# Patient Record
Sex: FEMALE | Race: BLACK OR AFRICAN AMERICAN | NOT HISPANIC OR LATINO | Employment: UNEMPLOYED | URBAN - METROPOLITAN AREA
[De-identification: names, ages, dates, MRNs, and addresses within clinical notes are randomized per-mention and may not be internally consistent; named-entity substitution may affect disease eponyms.]

---

## 2017-11-30 ENCOUNTER — LAB VISIT (OUTPATIENT)
Dept: LAB | Facility: HOSPITAL | Age: 27
End: 2017-11-30
Attending: FAMILY MEDICINE
Payer: COMMERCIAL

## 2017-11-30 ENCOUNTER — OFFICE VISIT (OUTPATIENT)
Dept: FAMILY MEDICINE | Facility: CLINIC | Age: 27
End: 2017-11-30
Payer: COMMERCIAL

## 2017-11-30 VITALS
WEIGHT: 233.69 LBS | SYSTOLIC BLOOD PRESSURE: 101 MMHG | BODY MASS INDEX: 39.9 KG/M2 | DIASTOLIC BLOOD PRESSURE: 79 MMHG | RESPIRATION RATE: 16 BRPM | HEIGHT: 64 IN | OXYGEN SATURATION: 99 % | TEMPERATURE: 98 F | HEART RATE: 79 BPM

## 2017-11-30 DIAGNOSIS — Z00.00 ROUTINE GENERAL MEDICAL EXAMINATION AT A HEALTH CARE FACILITY: ICD-10-CM

## 2017-11-30 DIAGNOSIS — Z00.00 ROUTINE GENERAL MEDICAL EXAMINATION AT A HEALTH CARE FACILITY: Primary | ICD-10-CM

## 2017-11-30 LAB
ALBUMIN SERPL BCP-MCNC: 3.4 G/DL
ALP SERPL-CCNC: 88 U/L
ALT SERPL W/O P-5'-P-CCNC: 9 U/L
ANION GAP SERPL CALC-SCNC: 8 MMOL/L
AST SERPL-CCNC: 12 U/L
BILIRUB SERPL-MCNC: 0.4 MG/DL
BUN SERPL-MCNC: 10 MG/DL
CALCIUM SERPL-MCNC: 9.4 MG/DL
CHLORIDE SERPL-SCNC: 104 MMOL/L
CHOLEST SERPL-MCNC: 196 MG/DL
CHOLEST/HDLC SERPL: 4.3 {RATIO}
CO2 SERPL-SCNC: 28 MMOL/L
CREAT SERPL-MCNC: 0.8 MG/DL
EST. GFR  (AFRICAN AMERICAN): >60 ML/MIN/1.73 M^2
EST. GFR  (NON AFRICAN AMERICAN): >60 ML/MIN/1.73 M^2
GLUCOSE SERPL-MCNC: 83 MG/DL
HDLC SERPL-MCNC: 46 MG/DL
HDLC SERPL: 23.5 %
LDLC SERPL CALC-MCNC: 133.2 MG/DL
NONHDLC SERPL-MCNC: 150 MG/DL
POTASSIUM SERPL-SCNC: 3.7 MMOL/L
PROT SERPL-MCNC: 7.8 G/DL
SODIUM SERPL-SCNC: 140 MMOL/L
TRIGL SERPL-MCNC: 84 MG/DL

## 2017-11-30 PROCEDURE — 80053 COMPREHEN METABOLIC PANEL: CPT

## 2017-11-30 PROCEDURE — 80061 LIPID PANEL: CPT

## 2017-11-30 PROCEDURE — 36415 COLL VENOUS BLD VENIPUNCTURE: CPT | Mod: PO

## 2017-11-30 PROCEDURE — 99385 PREV VISIT NEW AGE 18-39: CPT | Mod: S$GLB,,, | Performed by: FAMILY MEDICINE

## 2017-11-30 PROCEDURE — 99999 PR PBB SHADOW E&M-NEW PATIENT-LVL III: CPT | Mod: PBBFAC,,, | Performed by: FAMILY MEDICINE

## 2017-11-30 NOTE — PATIENT INSTRUCTIONS
Mediterranean Food Guide   People who live in the area around the [place] have a lower risk of heart disease. Researchers have recently shown that following a Mediterranean diet decreases heart disease by 30% in people whom are at-risk.   This lifestyle is built upon daily exercise along with a lot of fruit, vegetables, plant-based proteins, whole grains, fish and smaller amounts of poultry and red meat. Fatty fish (salmon), olive oil, and nuts make this diet higher in fat than the classic heart healthy diet. These fats are mostly unsaturated, and when consumed in place of saturated fat, are good for the heart. The pyramid above and the chart on the next page describe the types of food and serving sizes in this heart healthy meal plan.   Physical Activity- The Mediterranean Diet pyramid is built upon daily physical activity and exercise. Aim for at least 150 minutes of moderate to vigorous exercise every week. Moderate-to-vigorous exercises include walking at a brisk pace, biking, or swimming, among other activities that elevate your heart rate. Always choose activities that you enjoy and that are safe, in order to be active throughout your life.   Achieve and Maintain a Healthy Weight - The high fat content of the Mediterranean is healthy for your heart, but may lead to increased energy intake and weight gain if you dont pay attention to how much you are eating. If you are trying to lose weight, choose the smaller number of servings from each food group, and try to make your serving sizes match those listed. 2   Food Groups and Servings per day  Serving Sizes    Non-starchy Vegetables   4-8 servings per day  One serving is ½ cup of cooked vegetables or 1 cup raw vegetables   Non-starchy vegetables include artichoke, asparagus, beets, broccoli, [place] sprouts, cauliflower, cabbage, celery, carrots, tomatoes, eggplant, cucumber, onion, green and wax beans, zucchini, turnips, peppers, salad greens  and mushrooms. (Potatoes, peas, and corn are starchy vegetables.)    Fruit   2-4 servings per day  One serving is a small fresh fruit or ½ cup juice or ¼ cup dried fruit   Fresh fruits are preferred because of the fiber and other nutrients they contain. Fruits canned in light syrup or their own juice, and frozen fruit with little or no added sugar are also good choices. Use only small amounts of fruit juice (6 oz per day or less), since even unsweetened juices can contain as much sugar as regular soda.    Legumes and Nuts   1-3 servings per day  Legumes: One serving is ½ cup cooked kidney, black, garbanzo, jhaveri, soy (edamame), navy beans, split peas, or lentils, or ¼ cup fat free refried beans or baked beans   Nuts and Seeds: One serving is 2 Tbsp. sunflower or sesame seeds, 1 Tbsp. peanut butter,   7-8 walnuts or pecans, 20 peanuts, or 12-15 almonds   Aim for 1-2 servings of nuts or seeds and 1-2 servings of legumes per day. Legumes are high in fiber, protein, and minerals. Nuts are high in unsaturated fat, and may increase HDL without increasing LDL cholesterol levels.    Low-fat Dairy Products   1-3 servings per day  One serving is 1 cup of skim milk, non-fat yogurt, or 1oz of low-fat (part-skim) cheese   Calcium-fortified soy milk, soy yogurt, and soy cheese can take the place of dairy products. If servings of dairy or fortified soy are less than 2 per day, we advise a calcium and vitamin D supplement.    Fish or shellfish   2-3 times a week  One serving is 3 ounces (about the size of a deck of cards)   Cook fish by baking, sautéing, broiling, roasting, grilling, or poaching. Choose fatty fishes like salmon, herring, sardines, or mackerel often. The fat in fish is high in omega-3 fats, so it has healthy effects on triglycerides and blood cells.    Poultry, if desired   1-3 times a week  One serving is 3 ounces (about the size of a deck of cards)   Bake, sauté, stir alvarez, roast, or grill the poultry you eat, and  eat it without the skin.    Whole Grains and Starchy Vegetables   4-6 servings per day  One serving is about 1 ounce of any of these:   1 slice whole wheat bread ½ cup potatoes, sweet potatoes, corn, or peas   ½ large whole grain bun 1 small whole grain roll   6-inch whole wheat hien 6 whole grain crackers   ½ cup cooked whole grain cereal (oatmeal, cracked wheat, quinoa)   ½ cup cooked whole wheat pasta, brown rice, or barley   Whole grains are high in fiber and have less effect on blood sugar and triglyceride levels than refined, processed grains like white bread and pasta. Whole grains also keep the stomach full longer, making it easier to control hunger.

## 2017-11-30 NOTE — PROGRESS NOTES
"Subjective:      Patient ID: Florencia Weston is a 27 y.o. female.    Chief Complaint: Establish Care      History reviewed. No pertinent past medical history.  Past Surgical History:   Procedure Laterality Date    BREAST SURGERY       Family History   Problem Relation Age of Onset    Heart disease Father      Social History     Social History    Marital status:      Spouse name: N/A    Number of children: N/A    Years of education: N/A     Occupational History    Not on file.     Social History Main Topics    Smoking status: Never Smoker    Smokeless tobacco: Never Used    Alcohol use No    Drug use: No    Sexual activity: Yes     Partners: Male     Other Topics Concern    Not on file     Social History Narrative    No narrative on file     No current outpatient prescriptions on file.  Review of patient's allergies indicates:  No Known Allergies    Review of Systems   Constitutional: Negative for chills and fever.   HENT: Negative for ear pain and sore throat.    Respiratory: Negative for shortness of breath and wheezing.    Cardiovascular: Negative for chest pain and palpitations.   Gastrointestinal: Negative for abdominal pain and blood in stool.   Genitourinary: Negative for dysuria and frequency.   Musculoskeletal: Negative for gait problem and joint swelling.   Skin: Negative for color change and rash.   Neurological: Negative for seizures and speech difficulty.   Psychiatric/Behavioral: Negative for behavioral problems and hallucinations.     HPI  Here today to establish care.  She would also like to have labs drawn and paperwork for insurance.  Feeling well.  5 weeks postpartum and doing well.  Denies any concerns.  Healthy baby girl.    Objective:   /79 (BP Location: Right arm, Patient Position: Sitting, BP Method: Large (Manual))   Pulse 79   Temp 98.3 °F (36.8 °C) (Oral)   Resp 16   Ht 5' 4" (1.626 m)   Wt 106 kg (233 lb 11 oz)   SpO2 99%   BMI 40.11 kg/m²      Physical Exam "   Constitutional: She is oriented to person, place, and time. She is cooperative. No distress.   HENT:   Right Ear: Hearing, tympanic membrane, external ear and ear canal normal.   Left Ear: Hearing, tympanic membrane, external ear and ear canal normal.   Mouth/Throat: Uvula is midline, oropharynx is clear and moist and mucous membranes are normal.   Eyes: Conjunctivae and EOM are normal.   Cardiovascular: Normal rate, regular rhythm and normal heart sounds.    Pulmonary/Chest: Effort normal and breath sounds normal.   Abdominal: Soft. There is no tenderness. There is no rebound and no guarding.   Musculoskeletal: She exhibits no edema.   Neurological: She is alert and oriented to person, place, and time. Coordination and gait normal.   Skin: Skin is warm, dry and intact. No rash noted. She is not diaphoretic. No erythema.   Psychiatric: She has a normal mood and affect. Her speech is normal and behavior is normal.   Nursing note and vitals reviewed.          Assessment:       1. Routine general medical examination at a health care facility            Plan:         Routine general medical examination at a health care facility  Comments:  5 weeks post partum and doing well.   Here for insurance paperwork.  Handout on healthy diet and exercise.  Orders:  -     Comprehensive metabolic panel; Future; Expected date: 11/30/2017  -     Lipid panel; Future; Expected date: 11/30/2017        Patient Care Team:  Primary Doctor No as PCP - General    Return if symptoms worsen or fail to improve.

## 2017-12-01 NOTE — PROGRESS NOTES
Labs normal -  -goal 130 - paperwork ready to be picked up - need to know how many hours she was fasating prior to labs.

## 2018-02-02 RX ORDER — OSELTAMIVIR PHOSPHATE 75 MG/1
75 CAPSULE ORAL 2 TIMES DAILY
Qty: 10 CAPSULE | Refills: 0 | Status: CANCELLED | OUTPATIENT
Start: 2018-02-02 | End: 2018-02-07

## 2018-02-02 NOTE — TELEPHONE ENCOUNTER
Spoke with pt c/o sore throat and earache.   States her  has the flu last week.   States she has 13 month old asking if something can be called in.

## 2018-02-02 NOTE — TELEPHONE ENCOUNTER
----- Message from Kenzie Saenz sent at 2/2/2018  4:07 PM CST -----  Contact: pt  The pt states she was exposed to the flu and is staring to feel bad, the pt wants to be advised on which over he counter meds she can take, pt can be reached at [phone]///[id number]

## 2018-02-05 NOTE — TELEPHONE ENCOUNTER
Spoke to patient and she states that she is feeling better. She is still having some throat pain. She is not able to come into the office until tomorrow. In the mean time she would like to know what she could use for now.

## 2018-02-06 ENCOUNTER — OFFICE VISIT (OUTPATIENT)
Dept: FAMILY MEDICINE | Facility: CLINIC | Age: 28
End: 2018-02-06
Payer: MEDICAID

## 2018-02-06 ENCOUNTER — TELEPHONE (OUTPATIENT)
Dept: FAMILY MEDICINE | Facility: CLINIC | Age: 28
End: 2018-02-06

## 2018-02-06 VITALS
BODY MASS INDEX: 41.4 KG/M2 | DIASTOLIC BLOOD PRESSURE: 88 MMHG | SYSTOLIC BLOOD PRESSURE: 124 MMHG | HEART RATE: 88 BPM | HEIGHT: 64 IN | TEMPERATURE: 97 F | WEIGHT: 242.5 LBS | OXYGEN SATURATION: 98 % | RESPIRATION RATE: 16 BRPM

## 2018-02-06 DIAGNOSIS — H92.02 LEFT EAR PAIN: ICD-10-CM

## 2018-02-06 DIAGNOSIS — J01.00 ACUTE NON-RECURRENT MAXILLARY SINUSITIS: Primary | ICD-10-CM

## 2018-02-06 PROCEDURE — 99999 PR PBB SHADOW E&M-EST. PATIENT-LVL III: CPT | Mod: PBBFAC,,, | Performed by: FAMILY MEDICINE

## 2018-02-06 PROCEDURE — 99214 OFFICE O/P EST MOD 30 MIN: CPT | Mod: S$PBB,,, | Performed by: FAMILY MEDICINE

## 2018-02-06 PROCEDURE — 3008F BODY MASS INDEX DOCD: CPT | Mod: ,,, | Performed by: FAMILY MEDICINE

## 2018-02-06 PROCEDURE — 99213 OFFICE O/P EST LOW 20 MIN: CPT | Mod: PBBFAC,PO | Performed by: FAMILY MEDICINE

## 2018-02-06 RX ORDER — SULFAMETHOXAZOLE AND TRIMETHOPRIM 800; 160 MG/1; MG/1
1 TABLET ORAL 2 TIMES DAILY
Qty: 20 TABLET | Refills: 0 | Status: SHIPPED | OUTPATIENT
Start: 2018-02-06 | End: 2018-02-16

## 2018-02-06 NOTE — LETTER
February 6, 2018      [facility]  [address] [facility] [address]  Phone: [phone]       Patient: Florencia Weston   YOB: 1990  Date of Visit: 02/06/2018    To Whom It May Concern:    Marychuy Weston  was at [facility] on 02/06/2018. She may return to work/school on 2/6/18 with no restrictions. If you have any questions or concerns, or if I can be of further assistance, please do not hesitate to contact me.    Sincerely,    Ann Chinchilla MD

## 2018-02-06 NOTE — TELEPHONE ENCOUNTER
----- Message from Spring Nolasco sent at 2/6/2018  8:41 AM CST -----  Contact: Pt  Pt called and stated she needed to speak to the nurse. She stated that she needs to be seen today for left ear pain, sore throat and a productive cough. She also stated she needs to be squeezed into Dr. Chinchilla' schedule as soon as possible. She can be reached at [phone] (home).    Thanks,  TF

## 2018-02-06 NOTE — PROGRESS NOTES
"Subjective:      Patient ID: Florencia Weston is a 27 y.o. female.    Chief Complaint: Otalgia; Sore Throat; and Cough      No past medical history on file.  Past Surgical History:   Procedure Laterality Date    BREAST SURGERY       Family History   Problem Relation Age of Onset    Heart disease Father      Social History     Social History    Marital status:      Spouse name: N/A    Number of children: N/A    Years of education: N/A     Occupational History    Not on file.     Social History Main Topics    Smoking status: Never Smoker    Smokeless tobacco: Never Used    Alcohol use No    Drug use: No    Sexual activity: Yes     Partners: Male     Other Topics Concern    Not on file     Social History Narrative    No narrative on file       Current Outpatient Prescriptions:     sulfamethoxazole-trimethoprim 800-160mg (BACTRIM DS) 800-160 mg Tab, Take 1 tablet by mouth 2 (two) times daily., Disp: 20 tablet, Rfl: 0  Review of patient's allergies indicates:  No Known Allergies    Review of Systems   Constitutional: Negative for chills and fever.   HENT: Positive for ear pain, postnasal drip, sinus pain and sinus pressure.    Respiratory: Negative for shortness of breath and wheezing.    Cardiovascular: Negative for chest pain and palpitations.   Gastrointestinal: Negative for abdominal pain and blood in stool.     HPI  Left ear pain and left maxillary sinus pressure for around 3-4 days.  She has thick green sputum.  Fatigue and not feeling well.  No fever, chills, body aches.    Objective:   /88 (BP Location: Right arm, Patient Position: Sitting, BP Method: Small (Manual))   Pulse 88   Temp 97.3 °F (36.3 °C)   Resp 16   Ht 5' 4" (1.626 m)   Wt 110 kg (242 lb 8.1 oz)   SpO2 98%   BMI 41.63 kg/m²      Physical Exam   Constitutional: She is oriented to person, place, and time. She is cooperative. No distress.   HENT:   Right Ear: Hearing, tympanic membrane, external ear and ear canal normal. "   Left Ear: Hearing, external ear and ear canal normal. A middle ear effusion is present.   Mouth/Throat: Uvula is midline, oropharynx is clear and moist and mucous membranes are normal.   Eyes: Conjunctivae and EOM are normal.   Cardiovascular: Normal rate, regular rhythm and normal heart sounds.    Pulmonary/Chest: Effort normal and breath sounds normal.   Musculoskeletal: She exhibits no edema.   Neurological: She is alert and oriented to person, place, and time. No cranial nerve deficit. Coordination and gait normal.   Skin: Skin is warm, dry and intact. No rash noted. She is not diaphoretic. No erythema.   Psychiatric: She has a normal mood and affect. Her speech is normal and behavior is normal.   Nursing note and vitals reviewed.          Assessment:       1. Acute non-recurrent maxillary sinusitis    2. Left ear pain            Plan:         Acute non-recurrent maxillary sinusitis  Start Bactrim.  Left ear pain  Otalgia - secondary to eustachian tube dysfunction.  Other orders  -     sulfamethoxazole-trimethoprim 800-160mg (BACTRIM DS) 800-160 mg Tab; Take 1 tablet by mouth 2 (two) times daily.  Dispense: 20 tablet; Refill: 0    F/u if worsening/not resolved in a week.    Patient Care Team:  Primary Doctor No as PCP - General    Follow-up if symptoms worsen or fail to improve.

## 2018-04-05 ENCOUNTER — OFFICE VISIT (OUTPATIENT)
Dept: FAMILY MEDICINE | Facility: CLINIC | Age: 28
End: 2018-04-05
Payer: MEDICAID

## 2018-04-05 ENCOUNTER — TELEPHONE (OUTPATIENT)
Dept: FAMILY MEDICINE | Facility: CLINIC | Age: 28
End: 2018-04-05

## 2018-04-05 VITALS
TEMPERATURE: 98 F | BODY MASS INDEX: 43.77 KG/M2 | HEIGHT: 64 IN | RESPIRATION RATE: 16 BRPM | HEART RATE: 95 BPM | OXYGEN SATURATION: 96 % | SYSTOLIC BLOOD PRESSURE: 113 MMHG | DIASTOLIC BLOOD PRESSURE: 78 MMHG | WEIGHT: 256.38 LBS

## 2018-04-05 DIAGNOSIS — M54.9 UPPER BACK PAIN: ICD-10-CM

## 2018-04-05 DIAGNOSIS — Z87.39 HISTORY OF SCOLIOSIS: ICD-10-CM

## 2018-04-05 DIAGNOSIS — J30.89 ALLERGIC RHINITIS DUE TO OTHER ALLERGIC TRIGGER, UNSPECIFIED CHRONICITY, UNSPECIFIED SEASONALITY: Primary | ICD-10-CM

## 2018-04-05 PROBLEM — J30.9 ALLERGIC RHINITIS: Status: ACTIVE | Noted: 2018-04-05

## 2018-04-05 PROCEDURE — 99213 OFFICE O/P EST LOW 20 MIN: CPT | Mod: PBBFAC,PO | Performed by: REGISTERED NURSE

## 2018-04-05 PROCEDURE — 99999 PR PBB SHADOW E&M-EST. PATIENT-LVL III: CPT | Mod: PBBFAC,,, | Performed by: REGISTERED NURSE

## 2018-04-05 PROCEDURE — 99214 OFFICE O/P EST MOD 30 MIN: CPT | Mod: S$PBB,,, | Performed by: REGISTERED NURSE

## 2018-04-05 NOTE — TELEPHONE ENCOUNTER
----- Message from Lion Bellamy sent at 4/5/2018  7:49 AM CDT -----  Contact: pt  She's calling in regards to being worked into the schedule today, [phone] (home)

## 2018-04-05 NOTE — LETTER
April 5, 2018      [facility]  [address] [facility] [place] [address]  Phone: [phone]       Patient: Florencia Weston   YOB: 1990  Date of Visit: 04/05/2018    To Whom It May Concern:    Marychuy Weston  was at [facility] on 04/05/2018. She may return to work/school on 4/6/18 with no restrictions. If you have any questions or concerns, or if I can be of further assistance, please do not hesitate to contact me.    Sincerely,    Surendra Dugan NP

## 2018-04-05 NOTE — PROGRESS NOTES
"Subjective:       Patient ID: Florencia Weston is a 27 y.o. female.    Chief Complaint: Allergies      HPI    Florencia is here today with reports of allergies.  Did have some itchy ears earlier today but has since cleared up.  Denies any other allergy symptoms, did not take any allergy meds.  Went to work as normal, allergies did not prevent her from working normal job today.  Denies fever, chills, sneezing or nasal symptoms.    Also with c/o intermittent back pain with h/o scoliosis.  She has had this issue for the past 2 to 3 years, mainly to upper back area.  She is employed as a [employer], frequent bending and lifting.  Does not normally take pain meds, tries to rest or just deals w/ her pain.      Review of Systems   Constitutional: Negative for chills and fever.   HENT: Negative for congestion, ear pain, facial swelling, postnasal drip, rhinorrhea, sinus pressure and sneezing.    Eyes: Negative for photophobia, pain, discharge, redness, itching and visual disturbance.   Respiratory: Negative.    Cardiovascular: Negative.    Musculoskeletal: Positive for back pain. Negative for joint swelling, neck pain and neck stiffness.   Skin: Negative.    Allergic/Immunologic: Positive for environmental allergies.   Neurological: Negative.          Patient Active Problem List   Diagnosis    Morbid obesity with BMI of 40.0-44.9, adult    Allergic rhinitis       Past medical, social and family histories have been reviewed today.      Objective:     Vitals:    04/05/18 1507   BP: 113/78   BP Location: Left arm   Patient Position: Sitting   BP Method: Large (Manual)   Pulse: 95   Resp: 16   Temp: 97.7 °F (36.5 °C)   TempSrc: Tympanic   SpO2: 96%   Weight: 116.3 kg (256 lb 6.3 oz)   Height: 5' 4" (1.626 m)       Physical Exam   Constitutional: She is oriented to person, place, and time. She appears well-developed and well-nourished. No distress.   HENT:   Head: Normocephalic and atraumatic.   Right Ear: Tympanic membrane normal. "   Left Ear: Tympanic membrane normal.   Nose: Mucosal edema and rhinorrhea (boggy, clear RN) present. Right sinus exhibits no maxillary sinus tenderness and no frontal sinus tenderness. Left sinus exhibits no maxillary sinus tenderness and no frontal sinus tenderness.   Mouth/Throat: No oropharyngeal exudate, posterior oropharyngeal edema or posterior oropharyngeal erythema.   Eyes: Conjunctivae and EOM are normal. Pupils are equal, round, and reactive to light. Right eye exhibits no discharge. Left eye exhibits no discharge. No scleral icterus.   Neck: Normal range of motion. Neck supple.   Cardiovascular: Normal rate and regular rhythm.    Pulmonary/Chest: Effort normal and breath sounds normal.   Musculoskeletal: Normal range of motion. She exhibits no edema or tenderness.        Thoracic back: She exhibits deformity (mild scoliotic curvature noted with uneven rib hump). She exhibits normal range of motion, no tenderness, no swelling, no pain, no spasm and normal pulse.   Lymphadenopathy:     She has no cervical adenopathy.   Neurological: She is alert and oriented to person, place, and time.   Skin: Skin is warm and dry. Capillary refill takes less than 2 seconds. No rash noted. She is not diaphoretic.   Psychiatric: She has a normal mood and affect. Her behavior is normal. Judgment and thought content normal.   Vitals reviewed.      Diagnosis       1. Allergic rhinitis due to other allergic trigger, unspecified chronicity, unspecified seasonality    2. Upper back pain    3. History of scoliosis          Assessment/ Plan     Allergic rhinitis due to other allergic trigger, unspecified chronicity, unspecified seasonality  · To take allergy medication OTC per pt choice.  Symptomatic care, rest, fluids, hydration.    Upper back pain  · Ice/heat, rest, stretching exercises.  · OTC pain med as needed.  · Will hold off on xrays today as patient pain free and has known dx of scoliosis.  · May need to see scoliosis  specialist if pain worsens or persists.  · PT may help.    History of scoliosis  · See discussion for upper back pain.        Follow-up in clinic as needed.          EDUARDO Armendariz  [facility]

## 2018-04-11 ENCOUNTER — PATIENT MESSAGE (OUTPATIENT)
Dept: FAMILY MEDICINE | Facility: CLINIC | Age: 28
End: 2018-04-11

## 2018-04-11 NOTE — TELEPHONE ENCOUNTER
----- Message from Renetta Martinez sent at 4/11/2018  2:17 PM CDT -----  Contact: pt   Call pt regarding getting a orders for T-Spine x-ray for back pain fax [phone].  [phone] (home)

## 2018-04-11 NOTE — TELEPHONE ENCOUNTER
Patient needs to  Make appt to be evaluated if in pain.  Please call and schedule patient.  Thanks.

## 2018-04-11 NOTE — TELEPHONE ENCOUNTER
Sent my chart message via portal. Spoke to pt, states she was just in here. Says she will try and get an order for her job, but if not she'll call back and schedule an appt.

## 2018-04-12 ENCOUNTER — TELEPHONE (OUTPATIENT)
Dept: FAMILY MEDICINE | Facility: CLINIC | Age: 28
End: 2018-04-12

## 2018-04-12 PROBLEM — Z87.39 HISTORY OF SCOLIOSIS: Status: ACTIVE | Noted: 2018-04-12

## 2018-04-12 NOTE — TELEPHONE ENCOUNTER
----- Message from Víctor Dave sent at 4/12/2018  9:40 AM CDT -----  Contact: pt  Call pt at [phone] (home)   Regarding seeing if pt can be worked in today pt is having extreme back pain